# Patient Record
Sex: MALE | Race: WHITE | NOT HISPANIC OR LATINO | Employment: OTHER | URBAN - METROPOLITAN AREA
[De-identification: names, ages, dates, MRNs, and addresses within clinical notes are randomized per-mention and may not be internally consistent; named-entity substitution may affect disease eponyms.]

---

## 2020-09-21 DIAGNOSIS — N47.1 PHIMOSIS: Primary | ICD-10-CM

## 2020-10-06 ENCOUNTER — OFFICE VISIT (OUTPATIENT)
Dept: UROLOGY | Facility: CLINIC | Age: 85
End: 2020-10-06
Payer: COMMERCIAL

## 2020-10-06 VITALS
HEART RATE: 72 BPM | HEIGHT: 68 IN | BODY MASS INDEX: 22.73 KG/M2 | WEIGHT: 150 LBS | SYSTOLIC BLOOD PRESSURE: 175 MMHG | DIASTOLIC BLOOD PRESSURE: 74 MMHG

## 2020-10-06 DIAGNOSIS — N47.1 PHIMOSIS OF PENIS: ICD-10-CM

## 2020-10-06 DIAGNOSIS — N47.1 PHIMOSIS: Primary | ICD-10-CM

## 2020-10-06 PROCEDURE — 99203 PR OFFICE/OUTPT VISIT, NEW, LEVL III, 30-44 MIN: ICD-10-PCS | Mod: S$GLB,,, | Performed by: UROLOGY

## 2020-10-06 PROCEDURE — 99203 OFFICE O/P NEW LOW 30 MIN: CPT | Mod: S$GLB,,, | Performed by: UROLOGY

## 2020-10-06 RX ORDER — AMOXICILLIN AND CLAVULANATE POTASSIUM 875; 125 MG/1; MG/1
1 TABLET, FILM COATED ORAL 2 TIMES DAILY
COMMUNITY
Start: 2020-08-30

## 2020-10-06 RX ORDER — AMOXICILLIN 500 MG/1
500 CAPSULE ORAL 3 TIMES DAILY
COMMUNITY
Start: 2020-08-04

## 2020-10-06 RX ORDER — LOSARTAN POTASSIUM 50 MG/1
50 TABLET ORAL DAILY
COMMUNITY
Start: 2020-08-31

## 2020-10-06 RX ORDER — WARFARIN SODIUM 5 MG/1
TABLET ORAL
COMMUNITY

## 2020-10-06 NOTE — PROGRESS NOTES
Subjective:       Patient ID: Onesimo Stock is a 89 y.o. male.    Chief Complaint: Other (Phimosis)      HPI:  89-year-old male with worsening phimosis over the past several years to the point that he is completely unable to retract his foreskin and the foreskin opening is pinpoint.    Past Medical History:   Past Medical History:   Diagnosis Date    Blood clotting disorder     Hemorrhoids     High blood pressure        Past Surgical Historical:   Past Surgical History:   Procedure Laterality Date    HERNIA REPAIR      REPAIR OF LIGAMENT OF SHOULDER          Medications:   Medication List with Changes/Refills   Current Medications    AMOXICILLIN (AMOXIL) 500 MG CAPSULE    Take 500 mg by mouth 3 (three) times daily.    AMOXICILLIN-CLAVULANATE 875-125MG (AUGMENTIN) 875-125 MG PER TABLET    Take 1 tablet by mouth 2 (two) times daily.    ASPIRIN 162.5 MG CP24    aspirin    LOSARTAN (COZAAR) 50 MG TABLET    Take 50 mg by mouth once daily.    WARFARIN (COUMADIN) 5 MG TABLET    warfarin 5 mg tablet   Take 1 tablet every day by oral route.        Past Social History:   Social History     Socioeconomic History    Marital status:      Spouse name: Not on file    Number of children: Not on file    Years of education: Not on file    Highest education level: Not on file   Occupational History    Not on file   Social Needs    Financial resource strain: Not on file    Food insecurity     Worry: Not on file     Inability: Not on file    Transportation needs     Medical: Not on file     Non-medical: Not on file   Tobacco Use    Smoking status: Never Smoker    Smokeless tobacco: Never Used   Substance and Sexual Activity    Alcohol use: Never     Frequency: Never    Drug use: Never    Sexual activity: Not on file   Lifestyle    Physical activity     Days per week: Not on file     Minutes per session: Not on file    Stress: Not on file   Relationships    Social connections     Talks on phone: Not  on file     Gets together: Not on file     Attends [employer] service: Not on file     Active member of club or organization: Not on file     Attends meetings of clubs or organizations: Not on file     Relationship status: Not on file   Other Topics Concern    Not on file   Social History Narrative    Not on file       Allergies:   Review of patient's allergies indicates:   Allergen Reactions    Sulfa (sulfonamide antibiotics) Swelling        Family History:   Family History   Problem Relation Age of Onset    Liver cancer Sister         Review of Systems:  Review of Systems   Constitutional: Negative for activity change and appetite change.   HENT: Negative for congestion and dental problem.    Respiratory: Negative for chest tightness and shortness of breath.    Cardiovascular: Negative for chest pain.   Gastrointestinal: Negative for abdominal distention and abdominal pain.   Endocrine: Negative for polyuria.   Genitourinary: Negative for difficulty urinating, discharge, dysuria, flank pain, frequency, hematuria, penile pain, penile swelling, scrotal swelling, testicular pain and urgency.   Musculoskeletal: Negative for back pain and neck pain.   Allergic/Immunologic: Positive for food allergies.   Neurological: Negative for dizziness.   Hematological: Negative for adenopathy.   Psychiatric/Behavioral: Negative for agitation, behavioral problems and confusion.       Physical Exam:  Physical Exam   Constitutional: He appears well-developed.   HENT:   Head: Normocephalic and atraumatic.   Eyes: Pupils are equal, round, and reactive to light.   Cardiovascular: Normal rate, regular rhythm and normal heart sounds.    Pulmonary/Chest: Effort normal and breath sounds normal. No respiratory distress. He has no wheezes. He has no rales.   Abdominal: Soft. Bowel sounds are normal. He exhibits no distension. There is no abdominal tenderness. There is no rebound and no guarding.   Genitourinary:    Prostate normal.   No  penile tenderness.    Genitourinary Comments: Patient's patient's foreskin is completely unretractable he has a pinpoint opening of the foreskin     Musculoskeletal: Normal range of motion.       Assessment/Plan:       Problem List Items Addressed This Visit     None      Visit Diagnoses     Phimosis    -  Primary    Relevant Orders    Cystoscopy    Phimosis of penis                 Severe phimosis:  We will proceed with circumcision here in clinic under local anesthesia

## 2020-10-07 ENCOUNTER — PROCEDURE VISIT (OUTPATIENT)
Dept: UROLOGY | Facility: CLINIC | Age: 85
End: 2020-10-07
Payer: MEDICARE

## 2020-10-07 VITALS
HEIGHT: 68 IN | RESPIRATION RATE: 20 BRPM | DIASTOLIC BLOOD PRESSURE: 84 MMHG | SYSTOLIC BLOOD PRESSURE: 198 MMHG | OXYGEN SATURATION: 96 % | HEART RATE: 70 BPM | BODY MASS INDEX: 23.6 KG/M2 | WEIGHT: 155.69 LBS

## 2020-10-07 DIAGNOSIS — N47.1 PHIMOSIS: ICD-10-CM

## 2020-10-07 RX ORDER — CIPROFLOXACIN 500 MG/1
500 TABLET ORAL
Status: COMPLETED | OUTPATIENT
Start: 2020-10-07 | End: 2020-10-07

## 2020-10-07 RX ADMIN — CIPROFLOXACIN 500 MG: 500 TABLET ORAL at 09:10

## 2020-10-07 NOTE — PROCEDURES
Circumcision    Date/Time: 10/7/2020 9:00 AM  Location procedure was performed: [facility]  Performed by: Flavio Maldonado MD  Authorized by: Flavio Maldonado MD   Pre-operative diagnosis: Phimosis  Post-operative diagnosis: Same  Consent: Verbal consent obtained. Written consent obtained.  Risks and benefits: risks, benefits and alternatives were discussed  Consent given by: parent (The patient himself)  Description of findings: Patient is brought to the procedure room placed on the table in supine position a dorsal slit was performed as the phimosis was severe enough that it could not be retracted this point excess foreskin was removed circumferentially the 2 free skin edges were reapproximated after the area was made hemostatic with the Bovie.  Patient tolerated procedure well there were no complications   Pain Management: 1 mL 1% lidocaine

## 2020-10-07 NOTE — PATIENT INSTRUCTIONS
Adult Circumcision    Circumcision is a procedure to remove the foreskin, the loose fold of skin that covers the head of the penis. You may have a condition that requires circumcision. Or, you may want to be circumcised for personal reasons. Either way, you will want to know what to expect. Read on to learn more about adult circumcision and how its done.  Before the procedure  Tell your doctor about all medicines you take and any allergies you have. Cream to numb the skin of the penis may be applied 30 to 60 minutes before the procedure. Also, there will be some swelling and soreness after the procedure, so arrange for an adult family member or friend to drive you home.  During the procedure  · The penis and surrounding area are cleaned and prepared for the procedure.  · An intravenous (IV) line is placed in your hand or arm. It supplies fluids and medicine. This may include medicine (anesthesia) to prevent pain. Depending on what type of anesthesia you get, you may be awake, drowsy, or asleep during the procedure. Either way, the skin of the penis may be numbed with injections of local anesthesia.  · Once the penis is numb or you are drowsy or asleep, incisions are made in the foreskin. The foreskin is then removed.  · The incisions are closed with sutures (stitches) or surgical glue.  · The incision is covered with ointment and a bandage is put on the penis.  After the procedure  You will be taken to a recovery area where youll recover from the anesthesia. Nurses will check on you as you rest. They can also give you pain medicine if needed. Your doctor will tell you when its OK for you to go home. This will be the same day. When you dress to go home, wear snug-fitting, brief-style underwear. This will help hold your bandage in place. You will also be given care instructions for when you return home.  What to expect  · You will probably see a crust of blood or yellowish coating around the head of the penis. Do  not remove scabs. Its OK if they fall off on their own.  · The penis will swell. It may bleed a little around the incision.  · The head of the penis will be red or black-and-blue.  · You may have pain with urination for the first few days.  · Take pain medicine as instructed by your healthcare provider.  · Healing takes about 2 weeks. The stitches should dissolve on their own.  Caring for your penis  · You may shower 24 hours after surgery. When drying off, gently pat the penis dry.  · Dont take a bath or use a hot tub, Jacuzzi, or swimming pool for 2 weeks after surgery.  · Follow your healthcare providers instructions for bandage care. Change or remove the bandage only when told to do so by your healthcare provider. This will likely be the day after surgery.  · Avoid all sexual activity for 4 to 6 weeks after surgery. An erection can cause the incisions to open. Ask your healthcare provider what you can do to help stop erections.  Follow-up care  Make a follow-up appointment or as advised.  When to call your healthcare provider  Call the healthcare provider right away if you have any of the following:  · Fever of 100.4°F ( 38°C) or higher  · Increased redness, bruising, or swelling of the penis  · Discharge that is heavy, a greenish color, or lasts more than a week  · Bleeding that isnt controlled by applying gentle pressure  · Inability to urinate   Date Last Reviewed: 1/1/2017  © 5767-5173 [employer]. [address] [place] [address], [place] [address]. All rights reserved. This information is not intended as a substitute for professional medical care. Always follow your healthcare professional's instructions.

## 2020-10-21 ENCOUNTER — OFFICE VISIT (OUTPATIENT)
Dept: UROLOGY | Facility: CLINIC | Age: 85
End: 2020-10-21
Payer: MEDICARE

## 2020-10-21 VITALS
BODY MASS INDEX: 23.49 KG/M2 | RESPIRATION RATE: 18 BRPM | WEIGHT: 155 LBS | HEART RATE: 66 BPM | SYSTOLIC BLOOD PRESSURE: 164 MMHG | HEIGHT: 68 IN | DIASTOLIC BLOOD PRESSURE: 58 MMHG

## 2020-10-21 DIAGNOSIS — N47.1 PHIMOSIS OF PENIS: Primary | ICD-10-CM

## 2020-10-21 PROCEDURE — 99024 POSTOP FOLLOW-UP VISIT: CPT | Mod: S$GLB,POP,, | Performed by: UROLOGY

## 2020-10-21 PROCEDURE — 99024 PR POST-OP FOLLOW-UP VISIT: ICD-10-PCS | Mod: S$GLB,POP,, | Performed by: UROLOGY

## 2020-10-21 NOTE — PROGRESS NOTES
Subjective:       Patient ID: Onesimo Stock is a 89 y.o. male.    Chief Complaint: Phimosis Follow-up      HPI:  89-year-old male status post circumcision 2 weeks ago.  Overall he is doing quite well there is some irritation to his foreskin.    Past Medical History:   Past Medical History:   Diagnosis Date    Blood clotting disorder     Hemorrhoids     High blood pressure        Past Surgical Historical:   Past Surgical History:   Procedure Laterality Date    HERNIA REPAIR      REPAIR OF LIGAMENT OF SHOULDER          Medications:   Medication List with Changes/Refills   Current Medications    AMOXICILLIN (AMOXIL) 500 MG CAPSULE    Take 500 mg by mouth 3 (three) times daily.    AMOXICILLIN-CLAVULANATE 875-125MG (AUGMENTIN) 875-125 MG PER TABLET    Take 1 tablet by mouth 2 (two) times daily.    ASPIRIN 162.5 MG CP24    aspirin    LOSARTAN (COZAAR) 50 MG TABLET    Take 50 mg by mouth once daily.    WARFARIN (COUMADIN) 5 MG TABLET    warfarin 5 mg tablet   Take 1 tablet every day by oral route.        Past Social History:   Social History     Socioeconomic History    Marital status:      Spouse name: Not on file    Number of children: Not on file    Years of education: Not on file    Highest education level: Not on file   Occupational History    Not on file   Social Needs    Financial resource strain: Not on file    Food insecurity     Worry: Not on file     Inability: Not on file    Transportation needs     Medical: Not on file     Non-medical: Not on file   Tobacco Use    Smoking status: Never Smoker    Smokeless tobacco: Never Used   Substance and Sexual Activity    Alcohol use: Never     Frequency: Never    Drug use: Never    Sexual activity: Not on file   Lifestyle    Physical activity     Days per week: Not on file     Minutes per session: Not on file    Stress: Not on file   Relationships    Social connections     Talks on phone: Not on file     Gets together: Not on file      Attends Nondenominational service: Not on file     Active member of club or organization: Not on file     Attends meetings of clubs or organizations: Not on file     Relationship status: Not on file   Other Topics Concern    Not on file   Social History Narrative    Not on file       Allergies:   Review of patient's allergies indicates:   Allergen Reactions    Sulfa (sulfonamide antibiotics) Swelling        Family History:   Family History   Problem Relation Age of Onset    Liver cancer Sister         Review of Systems:  Review of Systems   Constitutional: Negative for activity change and appetite change.   HENT: Negative for congestion and dental problem.    Respiratory: Negative for chest tightness and shortness of breath.    Cardiovascular: Negative for chest pain.   Gastrointestinal: Negative for abdominal distention and abdominal pain.   Endocrine: Negative for polyuria.   Genitourinary: Negative for difficulty urinating, discharge, dysuria, flank pain, frequency, hematuria, penile pain, penile swelling, scrotal swelling, testicular pain and urgency.   Musculoskeletal: Negative for back pain and neck pain.   Allergic/Immunologic: Positive for food allergies.   Neurological: Negative for dizziness.   Hematological: Negative for adenopathy.   Psychiatric/Behavioral: Negative for agitation, behavioral problems and confusion.       Physical Exam:  Physical Exam  Constitutional:       Appearance: He is well-developed.   HENT:      Head: Normocephalic and atraumatic.   Eyes:      Pupils: Pupils are equal, round, and reactive to light.   Cardiovascular:      Rate and Rhythm: Normal rate and regular rhythm.      Heart sounds: Normal heart sounds.   Pulmonary:      Effort: Pulmonary effort is normal. No respiratory distress.      Breath sounds: Normal breath sounds. No wheezing or rales.   Abdominal:      General: Bowel sounds are normal. There is no distension.      Palpations: Abdomen is soft.      Tenderness: There is  no abdominal tenderness. There is no guarding or rebound.   Genitourinary:     Penis: Normal. No tenderness.       Prostate: Normal.   Musculoskeletal: Normal range of motion.         Assessment/Plan:       Problem List Items Addressed This Visit     None      Visit Diagnoses     Phimosis of penis    -  Primary             Status post circumcision he is doing well there is some irritation and erythema he is to continue to keep it covered in Neosporin and this will improve return to clinic as need

## 2020-11-04 ENCOUNTER — TELEPHONE (OUTPATIENT)
Dept: UROLOGY | Facility: CLINIC | Age: 85
End: 2020-11-04

## 2020-11-04 NOTE — TELEPHONE ENCOUNTER
----- Message from Debbie Banuelos sent at 11/4/2020 11:52 AM CST -----  Regarding: Confirmation and notes  Contact: Haritha Long/Kemal Long/Kemal DAY is calling to confirm that pt was seen and to also obtain chart notes for the visits. Please call back at [phone] ext [phone]//thank you acc